# Patient Record
Sex: MALE | Race: WHITE
[De-identification: names, ages, dates, MRNs, and addresses within clinical notes are randomized per-mention and may not be internally consistent; named-entity substitution may affect disease eponyms.]

---

## 2019-06-13 ENCOUNTER — HOSPITAL ENCOUNTER (INPATIENT)
Dept: HOSPITAL 12 - ER | Age: 64
LOS: 4 days | Discharge: NURSING, REHAB OR LONG-TERM CARE | DRG: 689 | End: 2019-06-17
Attending: INTERNAL MEDICINE | Admitting: INTERNAL MEDICINE
Payer: MEDICAID

## 2019-06-13 VITALS — DIASTOLIC BLOOD PRESSURE: 66 MMHG | SYSTOLIC BLOOD PRESSURE: 116 MMHG

## 2019-06-13 VITALS — WEIGHT: 123 LBS | BODY MASS INDEX: 18.64 KG/M2 | HEIGHT: 68 IN

## 2019-06-13 VITALS — SYSTOLIC BLOOD PRESSURE: 137 MMHG | DIASTOLIC BLOOD PRESSURE: 78 MMHG

## 2019-06-13 VITALS — DIASTOLIC BLOOD PRESSURE: 67 MMHG | SYSTOLIC BLOOD PRESSURE: 123 MMHG

## 2019-06-13 VITALS — DIASTOLIC BLOOD PRESSURE: 59 MMHG | SYSTOLIC BLOOD PRESSURE: 116 MMHG

## 2019-06-13 DIAGNOSIS — Z91.15: ICD-10-CM

## 2019-06-13 DIAGNOSIS — Z88.2: ICD-10-CM

## 2019-06-13 DIAGNOSIS — Z74.01: ICD-10-CM

## 2019-06-13 DIAGNOSIS — F03.90: ICD-10-CM

## 2019-06-13 DIAGNOSIS — D53.9: ICD-10-CM

## 2019-06-13 DIAGNOSIS — N18.6: ICD-10-CM

## 2019-06-13 DIAGNOSIS — E78.5: ICD-10-CM

## 2019-06-13 DIAGNOSIS — G93.41: ICD-10-CM

## 2019-06-13 DIAGNOSIS — Z79.4: ICD-10-CM

## 2019-06-13 DIAGNOSIS — I69.351: ICD-10-CM

## 2019-06-13 DIAGNOSIS — E43: ICD-10-CM

## 2019-06-13 DIAGNOSIS — I25.10: ICD-10-CM

## 2019-06-13 DIAGNOSIS — R29.723: ICD-10-CM

## 2019-06-13 DIAGNOSIS — Z79.51: ICD-10-CM

## 2019-06-13 DIAGNOSIS — Z99.2: ICD-10-CM

## 2019-06-13 DIAGNOSIS — E11.51: ICD-10-CM

## 2019-06-13 DIAGNOSIS — I12.0: ICD-10-CM

## 2019-06-13 DIAGNOSIS — R13.10: ICD-10-CM

## 2019-06-13 DIAGNOSIS — R62.7: ICD-10-CM

## 2019-06-13 DIAGNOSIS — Z22.322: ICD-10-CM

## 2019-06-13 DIAGNOSIS — J45.909: ICD-10-CM

## 2019-06-13 DIAGNOSIS — E11.22: ICD-10-CM

## 2019-06-13 DIAGNOSIS — M19.90: ICD-10-CM

## 2019-06-13 DIAGNOSIS — Z89.512: ICD-10-CM

## 2019-06-13 DIAGNOSIS — E11.42: ICD-10-CM

## 2019-06-13 DIAGNOSIS — N39.0: Primary | ICD-10-CM

## 2019-06-13 LAB
ALP SERPL-CCNC: 81 U/L (ref 50–136)
ALT SERPL W/O P-5'-P-CCNC: 31 U/L (ref 16–63)
AST SERPL-CCNC: 16 U/L (ref 15–37)
BASOPHILS # BLD AUTO: 0.1 K/UL (ref 0–8)
BASOPHILS NFR BLD AUTO: 1.3 % (ref 0–2)
BILIRUB DIRECT SERPL-MCNC: 0.1 MG/DL (ref 0–0.2)
BILIRUB SERPL-MCNC: 0.2 MG/DL (ref 0.2–1)
BUN SERPL-MCNC: 52 MG/DL (ref 7–18)
CHLORIDE SERPL-SCNC: 107 MMOL/L (ref 98–107)
CO2 SERPL-SCNC: 33 MMOL/L (ref 21–32)
CREAT SERPL-MCNC: 3.8 MG/DL (ref 0.6–1.3)
EOSINOPHIL # BLD AUTO: 0.2 K/UL (ref 0–0.7)
EOSINOPHIL NFR BLD AUTO: 2.7 % (ref 0–7)
GLUCOSE SERPL-MCNC: 111 MG/DL (ref 74–106)
HCT VFR BLD AUTO: 33 % (ref 36.7–47.1)
HGB BLD-MCNC: 10.9 G/DL (ref 12.5–16.3)
LIPASE SERPL-CCNC: 80 U/L (ref 73–393)
LYMPHOCYTES # BLD AUTO: 1.6 K/UL (ref 20–40)
LYMPHOCYTES NFR BLD AUTO: 23.8 % (ref 20.5–51.5)
MCH RBC QN AUTO: 32 UUG (ref 23.8–33.4)
MCHC RBC AUTO-ENTMCNC: 33 G/DL (ref 32.5–36.3)
MCV RBC AUTO: 97.2 FL (ref 73–96.2)
MONOCYTES # BLD AUTO: 0.3 K/UL (ref 2–10)
MONOCYTES NFR BLD AUTO: 4.3 % (ref 0–11)
NEUTROPHILS # BLD AUTO: 4.5 K/UL (ref 1.8–8.9)
NEUTROPHILS NFR BLD AUTO: 67.9 % (ref 38.5–71.5)
PLATELET # BLD AUTO: 246 K/UL (ref 152–348)
POTASSIUM SERPL-SCNC: 4.5 MMOL/L (ref 3.5–5.1)
RBC # BLD AUTO: 3.4 MIL/UL (ref 4.06–5.63)
WBC # BLD AUTO: 6.7 K/UL (ref 3.6–10.2)
WS STN SPEC: 6.3 G/DL (ref 6.4–8.2)

## 2019-06-13 PROCEDURE — G0378 HOSPITAL OBSERVATION PER HR: HCPCS

## 2019-06-13 PROCEDURE — A4663 DIALYSIS BLOOD PRESSURE CUFF: HCPCS

## 2019-06-13 PROCEDURE — C1758 CATHETER, URETERAL: HCPCS

## 2019-06-13 PROCEDURE — 5A1D70Z PERFORMANCE OF URINARY FILTRATION, INTERMITTENT, LESS THAN 6 HOURS PER DAY: ICD-10-PCS | Performed by: INTERNAL MEDICINE

## 2019-06-13 RX ADMIN — LACTOBACILLUS ACIDOPH-L.BULGARICUS 1 MILLION CELL CHEWABLE TABLET SCH TAB.CHEW: at 20:48

## 2019-06-13 RX ADMIN — DIVALPROEX SODIUM SCH MG: 125 CAPSULE ORAL at 17:30

## 2019-06-13 RX ADMIN — DOCUSATE SODIUM SCH MG: 100 CAPSULE, LIQUID FILLED ORAL at 17:30

## 2019-06-13 RX ADMIN — ANORECTAL OINTMENT SCH GM: 15.7; .44; 24; 20.6 OINTMENT TOPICAL at 20:51

## 2019-06-13 RX ADMIN — CLONAZEPAM SCH MG: 0.5 TABLET ORAL at 17:32

## 2019-06-13 RX ADMIN — BENZTROPINE MESYLATE SCH MG: 1 TABLET ORAL at 17:30

## 2019-06-13 RX ADMIN — HYDROCODONE BITARTRATE AND ACETAMINOPHEN PRN TAB: 5; 325 TABLET ORAL at 20:48

## 2019-06-13 RX ADMIN — CALCIUM ACETATE SCH MG: 667 CAPSULE ORAL at 17:30

## 2019-06-13 NOTE — NUR
RECEIVED PT COMFORTABLY ON BED. PT SHOWS NO SIGNS OF ACUTE DISTRESS. PT IV INTACT. SAFETY 
AND COMFORT PROVIDED. WILL CONTINUE TO MONITOR.

## 2019-06-13 NOTE — NUR
RECEIVED PATIENT FROM ER VIA [facility], WITH DIAGNOSIS  OF  RENAL FAILURE,  PATIENT ALERT  AND 
ORIENTED TO NAME, PATIENT  IS CONFUSED.   PATIENT  HAS RIGHT SIDE  HEMIPARESIS, RIGHT 
CONTRACTURES, A DIALYSIS PATIENT WITH  CENTRAL LINE ON THE LEFT  CHEST. IV ON THE LEFT 
FOREARM.   MD AWARE OF PATIENT . BED IN LOW  POSITION , SIDE RAILS UP  WITH BED ALARM.  WILL 
CONTINUE TREATMENT AND WILL CONTINUE TO MONITOR.

## 2019-06-13 NOTE — NUR
PATIENT IN BED  AWAKE AND ALERT, NO SOB  NOTED AT THIS TIME,  NO C/O PAIN  AT THIS TIME, 
PROVIDE  SAFETY AND COMFORT  AT ALL TIMES.   ECHOCARDIOGRAM DONE,  WILL CONTINUE TREATMENT 
PLAN.

## 2019-06-13 NOTE — NUR
Patient is resting comfortably on gurney while actively moving both legs & left 
arm, respiration:easy, for transfer to 3rd floor telemetry floor 316 as soon as 
possible.

## 2019-06-14 VITALS — SYSTOLIC BLOOD PRESSURE: 101 MMHG | DIASTOLIC BLOOD PRESSURE: 49 MMHG

## 2019-06-14 VITALS — SYSTOLIC BLOOD PRESSURE: 111 MMHG | DIASTOLIC BLOOD PRESSURE: 65 MMHG

## 2019-06-14 VITALS — DIASTOLIC BLOOD PRESSURE: 70 MMHG | SYSTOLIC BLOOD PRESSURE: 125 MMHG

## 2019-06-14 VITALS — SYSTOLIC BLOOD PRESSURE: 104 MMHG | DIASTOLIC BLOOD PRESSURE: 71 MMHG

## 2019-06-14 LAB
ALP SERPL-CCNC: 77 U/L (ref 50–136)
ALT SERPL W/O P-5'-P-CCNC: 28 U/L (ref 16–63)
APPEARANCE UR: (no result)
AST SERPL-CCNC: 16 U/L (ref 15–37)
BASOPHILS # BLD AUTO: 0 K/UL (ref 0–8)
BASOPHILS NFR BLD AUTO: 0.8 % (ref 0–2)
BILIRUB SERPL-MCNC: 0.1 MG/DL (ref 0.2–1)
BILIRUB UR QL STRIP: NEGATIVE
BUN SERPL-MCNC: 34 MG/DL (ref 7–18)
CHLORIDE SERPL-SCNC: 112 MMOL/L (ref 98–107)
CHOLEST SERPL-MCNC: 85 MG/DL (ref ?–200)
CO2 SERPL-SCNC: 31 MMOL/L (ref 21–32)
COLOR UR: YELLOW
CREAT SERPL-MCNC: 2.5 MG/DL (ref 0.6–1.3)
DEPRECATED SQUAMOUS URNS QL MICRO: (no result) /HPF
EOSINOPHIL # BLD AUTO: 0.2 K/UL (ref 0–0.7)
EOSINOPHIL NFR BLD AUTO: 3.9 % (ref 0–7)
GLUCOSE SERPL-MCNC: 85 MG/DL (ref 74–106)
GLUCOSE UR STRIP-MCNC: NEGATIVE MG/DL
HCT VFR BLD AUTO: 31.3 % (ref 36.7–47.1)
HDLC SERPL-MCNC: 31 MG/DL (ref 40–60)
HGB BLD-MCNC: 10.4 G/DL (ref 12.5–16.3)
HGB UR QL STRIP: (no result)
IRON SERPL-MCNC: 92 UG/DL (ref 50–175)
KETONES UR STRIP-MCNC: NEGATIVE MG/DL
LEUKOCYTE ESTERASE UR QL STRIP: (no result)
LYMPHOCYTES # BLD AUTO: 1.6 K/UL (ref 20–40)
LYMPHOCYTES NFR BLD AUTO: 27.6 % (ref 20.5–51.5)
MAGNESIUM SERPL-MCNC: 2 MG/DL (ref 1.8–2.4)
MCH RBC QN AUTO: 32.2 UUG (ref 23.8–33.4)
MCHC RBC AUTO-ENTMCNC: 33 G/DL (ref 32.5–36.3)
MCV RBC AUTO: 97.4 FL (ref 73–96.2)
MONOCYTES # BLD AUTO: 0.3 K/UL (ref 2–10)
MONOCYTES NFR BLD AUTO: 5.1 % (ref 0–11)
NEUTROPHILS # BLD AUTO: 3.7 K/UL (ref 1.8–8.9)
NEUTROPHILS NFR BLD AUTO: 62.6 % (ref 38.5–71.5)
NITRITE UR QL STRIP: NEGATIVE
PH UR STRIP: 6 [PH] (ref 5–8)
PHOSPHATE SERPL-MCNC: 3.8 MG/DL (ref 2.5–4.9)
PLATELET # BLD AUTO: 198 K/UL (ref 152–348)
POTASSIUM SERPL-SCNC: 4 MMOL/L (ref 3.5–5.1)
RBC # BLD AUTO: 3.22 MIL/UL (ref 4.06–5.63)
SP GR UR STRIP: 1.02 (ref 1–1.03)
TRIGL SERPL-MCNC: 48 MG/DL (ref 30–150)
TSH SERPL DL<=0.005 MIU/L-ACNC: 2.46 MIU/ML (ref 0.36–3.74)
UROBILINOGEN UR STRIP-MCNC: 0.2 E.U./DL
WBC # BLD AUTO: 5.9 K/UL (ref 3.6–10.2)
WBC #/AREA URNS HPF: (no result) /HPF
WS STN SPEC: 5.7 G/DL (ref 6.4–8.2)

## 2019-06-14 RX ADMIN — DOCUSATE SODIUM SCH MG: 100 CAPSULE, LIQUID FILLED ORAL at 10:08

## 2019-06-14 RX ADMIN — LACTOBACILLUS ACIDOPH-L.BULGARICUS 1 MILLION CELL CHEWABLE TABLET SCH TAB.CHEW: at 10:08

## 2019-06-14 RX ADMIN — CALCIUM ACETATE SCH MG: 667 CAPSULE ORAL at 17:26

## 2019-06-14 RX ADMIN — DIVALPROEX SODIUM SCH MG: 125 CAPSULE ORAL at 17:26

## 2019-06-14 RX ADMIN — FOLIC ACID SCH MG: 1 TABLET ORAL at 10:09

## 2019-06-14 RX ADMIN — DIVALPROEX SODIUM SCH MG: 125 CAPSULE ORAL at 12:48

## 2019-06-14 RX ADMIN — DOCUSATE SODIUM SCH MG: 100 CAPSULE, LIQUID FILLED ORAL at 17:26

## 2019-06-14 RX ADMIN — CLONAZEPAM SCH MG: 0.5 TABLET ORAL at 00:45

## 2019-06-14 RX ADMIN — ANORECTAL OINTMENT SCH APPLIC: 15.7; .44; 24; 20.6 OINTMENT TOPICAL at 11:02

## 2019-06-14 RX ADMIN — BENZTROPINE MESYLATE SCH MG: 1 TABLET ORAL at 17:29

## 2019-06-14 RX ADMIN — CALCIUM ACETATE SCH MG: 667 CAPSULE ORAL at 10:08

## 2019-06-14 RX ADMIN — Medication SCH TAB: at 12:48

## 2019-06-14 RX ADMIN — CLONAZEPAM SCH MG: 0.5 TABLET ORAL at 10:18

## 2019-06-14 RX ADMIN — VITAMIN D, TAB 1000IU (100/BT) SCH UNIT: 25 TAB at 10:07

## 2019-06-14 RX ADMIN — ANORECTAL OINTMENT SCH APPLIC: 15.7; .44; 24; 20.6 OINTMENT TOPICAL at 21:03

## 2019-06-14 RX ADMIN — BENZTROPINE MESYLATE SCH MG: 1 TABLET ORAL at 12:48

## 2019-06-14 RX ADMIN — LACTOBACILLUS ACIDOPH-L.BULGARICUS 1 MILLION CELL CHEWABLE TABLET SCH TAB.CHEW: at 20:59

## 2019-06-14 RX ADMIN — DEXTROSE SCH MLS/HR: 50 INJECTION, SOLUTION INTRAVENOUS at 12:44

## 2019-06-14 RX ADMIN — BENZTROPINE MESYLATE SCH MG: 1 TABLET ORAL at 10:09

## 2019-06-14 RX ADMIN — DIVALPROEX SODIUM SCH MG: 125 CAPSULE ORAL at 10:08

## 2019-06-14 RX ADMIN — CLONAZEPAM SCH MG: 0.5 TABLET ORAL at 17:26

## 2019-06-14 RX ADMIN — PANTOPRAZOLE SODIUM SCH MG: 40 TABLET, DELAYED RELEASE ORAL at 06:21

## 2019-06-14 NOTE — NUR
SEEN AND EXAMINED BY WOUND CARE RN. PT NEEDS AN ADDITIONAL PHOTO ON HIS RIGHT SIDE OF HIS 
LEG AND FOOT AND ALSO ON HIS LEFT FA. ENDORSED IT TO THE NEXT SHIFT NURSE TO FOL;LOW IT UP.

## 2019-06-14 NOTE — NUR
AT 0050 NOTIFY  ON CALL REGARDING PT TEMPERATURE OF 95.3"F. HILARY PICKENS ORDERED 
PANCULTURE ( BLOOD CULTURE, URINE CULTURE, RESPIRATORY CULTURE AND URINALISYS)CHARGE NURSE 
AWARE . PUT BEAR HUGGER FOR THE PT. RECENT TEMPERATURE WAS 97.9 'F.  PT SLEPT 
INTERMITTENTLY. PT SHOWS NO SIGNS OF ACUTE DISTRESS. PRESCRIBED MEDICATION GIVEN AND PT 
TOLERATED IT WELL. SAFETY AND COMFORT PROVIDED. WILL ENDORSE ACCORDINGLY TO INCOMING NURSE 
FOR CONTINUITY OF CARE.

## 2019-06-14 NOTE — NUR
REPOSITION TO SIDES AND ALL SIDERAILS ARE PADDED FOR SAFETY. PT IS EATING AND SWALLOWING 
WELL WITH MAX ASSIST.

## 2019-06-14 NOTE — NUR
Received pt asleep lying on bed, no s/sx of acute distress. On room air, breathing even and 
unlabored. Left BKA noted, Rt. upper arm flaccid. Will continue to monitor.

## 2019-06-14 NOTE — NUR
WOUND CARE CONSULT: PT PRESENTS WITH SACRAL SCARRING, RT ARM SKIN TEAR AND CALLUSES TO RT 
LATERAL FOOT WITH DRY ABRASIONS TO RT LOWER LEG, PRESENT ON ADMISSION. PT COMBATIVE AT 
TIMES. PT IS INCONTINENT. LEFT BELOW KNEE AMPUTATION STUMP NOTED. PT TENDS TO KICK HIS LEGS 
AT TIMES. RECOMMENDATIONS MADE FOR WOUND CARE AND SKIN PROTECTION. DISCUSSED WITH NURSING 
STAFF. WILL SEE PRN. MD IN AGREEMENT WITH PLAN OF CARE. 

-------------------------------------------------------------------------------

Addendum: 06/14/19 at 1510 by AMARIS OLIVARES RN

-------------------------------------------------------------------------------

Amended: Links added.

## 2019-06-14 NOTE — NUR
RECIEVED PT LYING IN BED, THRUSHING AND SOMEWHAT RESTLESS. APHASIC, FACE IS EVEN AND 
SYMMETRICAL, FOLLOWS SIMPOLE COMMANDS. PT IS RIGHT SIDED WEAKNESS AND UPPER ARM IS 
CONTRACTED. HR -SB. IV HL ON THE LEFT FA. PT HAS A BKA. STUMP IS INTACT.

## 2019-06-15 VITALS — SYSTOLIC BLOOD PRESSURE: 125 MMHG | DIASTOLIC BLOOD PRESSURE: 69 MMHG

## 2019-06-15 VITALS — SYSTOLIC BLOOD PRESSURE: 101 MMHG | DIASTOLIC BLOOD PRESSURE: 68 MMHG

## 2019-06-15 VITALS — SYSTOLIC BLOOD PRESSURE: 114 MMHG | DIASTOLIC BLOOD PRESSURE: 67 MMHG

## 2019-06-15 VITALS — DIASTOLIC BLOOD PRESSURE: 78 MMHG | SYSTOLIC BLOOD PRESSURE: 120 MMHG

## 2019-06-15 VITALS — SYSTOLIC BLOOD PRESSURE: 128 MMHG | DIASTOLIC BLOOD PRESSURE: 69 MMHG

## 2019-06-15 RX ADMIN — MUPIROCIN SCH APPLIC: 20 OINTMENT TOPICAL at 21:00

## 2019-06-15 RX ADMIN — ANORECTAL OINTMENT SCH APPLIC: 15.7; .44; 24; 20.6 OINTMENT TOPICAL at 22:22

## 2019-06-15 RX ADMIN — MUPIROCIN SCH APPLIC: 20 OINTMENT TOPICAL at 19:00

## 2019-06-15 RX ADMIN — FOLIC ACID SCH MG: 1 TABLET ORAL at 09:29

## 2019-06-15 RX ADMIN — CLONAZEPAM SCH MG: 0.5 TABLET ORAL at 00:09

## 2019-06-15 RX ADMIN — Medication SCH TAB: at 09:29

## 2019-06-15 RX ADMIN — ANORECTAL OINTMENT SCH APPLIC: 15.7; .44; 24; 20.6 OINTMENT TOPICAL at 13:59

## 2019-06-15 RX ADMIN — BENZTROPINE MESYLATE SCH MG: 1 TABLET ORAL at 16:37

## 2019-06-15 RX ADMIN — DIVALPROEX SODIUM SCH MG: 125 CAPSULE ORAL at 16:37

## 2019-06-15 RX ADMIN — BENZTROPINE MESYLATE SCH MG: 1 TABLET ORAL at 09:28

## 2019-06-15 RX ADMIN — DOCUSATE SODIUM SCH MG: 100 CAPSULE, LIQUID FILLED ORAL at 09:28

## 2019-06-15 RX ADMIN — LACTOBACILLUS ACIDOPH-L.BULGARICUS 1 MILLION CELL CHEWABLE TABLET SCH TAB.CHEW: at 09:29

## 2019-06-15 RX ADMIN — VITAMIN D, TAB 1000IU (100/BT) SCH UNIT: 25 TAB at 09:27

## 2019-06-15 RX ADMIN — DIVALPROEX SODIUM SCH MG: 125 CAPSULE ORAL at 13:58

## 2019-06-15 RX ADMIN — CALCIUM ACETATE SCH MG: 667 CAPSULE ORAL at 09:29

## 2019-06-15 RX ADMIN — LACTOBACILLUS ACIDOPH-L.BULGARICUS 1 MILLION CELL CHEWABLE TABLET SCH TAB.CHEW: at 22:05

## 2019-06-15 RX ADMIN — DEXTROSE SCH MLS/HR: 50 INJECTION, SOLUTION INTRAVENOUS at 12:03

## 2019-06-15 RX ADMIN — CALCIUM ACETATE SCH MG: 667 CAPSULE ORAL at 16:36

## 2019-06-15 RX ADMIN — HYDROCODONE BITARTRATE AND ACETAMINOPHEN PRN TAB: 5; 325 TABLET ORAL at 22:04

## 2019-06-15 RX ADMIN — BENZTROPINE MESYLATE SCH MG: 1 TABLET ORAL at 13:58

## 2019-06-15 RX ADMIN — CLONAZEPAM SCH MG: 0.5 TABLET ORAL at 09:28

## 2019-06-15 RX ADMIN — DIVALPROEX SODIUM SCH MG: 125 CAPSULE ORAL at 09:30

## 2019-06-15 RX ADMIN — MUPIROCIN SCH APPLIC: 20 OINTMENT TOPICAL at 22:05

## 2019-06-15 RX ADMIN — DOCUSATE SODIUM SCH MG: 100 CAPSULE, LIQUID FILLED ORAL at 16:37

## 2019-06-15 RX ADMIN — CLONAZEPAM SCH MG: 0.5 TABLET ORAL at 16:37

## 2019-06-15 RX ADMIN — PANTOPRAZOLE SODIUM SCH MG: 40 TABLET, DELAYED RELEASE ORAL at 06:01

## 2019-06-15 NOTE — NUR
patient received lying in bed. a/ox1 and confused. safety and comfort measures provided. bed 
in lowest position and side rails padded. will continue to care for patient.

## 2019-06-15 NOTE — NUR
Pt now resting comfortably in bed, no SOB and no s/sx of distress. DVT pumps in room, 
offered to apply in right leg, pt refused overnight. Dressing to right elbow skin tear 
changed, covered with Kerlix clean dry and intact. Repositioned for comfort, kept clean and 
dry. Consent to HD in chart, scheduled for today. Report given to incoming nurse for 
continuity of care.

## 2019-06-15 NOTE — NUR
RECIEVED PT VERY SOUND ASLEEP IN BED, WITH NO APPARENT DISTRESS NOTED. COLOR IS GOOD. ALL 
SIDERAILS ARE PADDED FOR SAFETY. TELE IS SB, SR, NO ECTOPY.VSS.

## 2019-06-15 NOTE — NUR
patient's meds given late due to dialysis nurse arriving at 2000 and dialyzing patient. 
dialysis nurse, Kelsy Miguel confirmed with me that the medications prescribed for tonight 
were okay to give to patient. patient refused Bactroban to the nares for MRSA. I educated 
the patient that it was medically necessary for him to get the ointment in his nares to 
treat him for the MRSA. He stated, "no" and pushed my hands away a few times.

## 2019-06-16 VITALS — SYSTOLIC BLOOD PRESSURE: 135 MMHG | DIASTOLIC BLOOD PRESSURE: 78 MMHG

## 2019-06-16 VITALS — DIASTOLIC BLOOD PRESSURE: 72 MMHG | SYSTOLIC BLOOD PRESSURE: 129 MMHG

## 2019-06-16 VITALS — DIASTOLIC BLOOD PRESSURE: 83 MMHG | SYSTOLIC BLOOD PRESSURE: 137 MMHG

## 2019-06-16 VITALS — DIASTOLIC BLOOD PRESSURE: 89 MMHG | SYSTOLIC BLOOD PRESSURE: 137 MMHG

## 2019-06-16 LAB
ALP SERPL-CCNC: 80 U/L (ref 50–136)
ALT SERPL W/O P-5'-P-CCNC: 26 U/L (ref 16–63)
AST SERPL-CCNC: 15 U/L (ref 15–37)
BASOPHILS # BLD AUTO: 0.1 K/UL (ref 0–8)
BASOPHILS NFR BLD AUTO: 0.9 % (ref 0–2)
BILIRUB SERPL-MCNC: 0.2 MG/DL (ref 0.2–1)
BUN SERPL-MCNC: 29 MG/DL (ref 7–18)
CHLORIDE SERPL-SCNC: 111 MMOL/L (ref 98–107)
CO2 SERPL-SCNC: 32 MMOL/L (ref 21–32)
CREAT SERPL-MCNC: 2.1 MG/DL (ref 0.6–1.3)
EOSINOPHIL # BLD AUTO: 0.2 K/UL (ref 0–0.7)
EOSINOPHIL NFR BLD AUTO: 4.2 % (ref 0–7)
GLUCOSE SERPL-MCNC: 70 MG/DL (ref 74–106)
HCT VFR BLD AUTO: 31.5 % (ref 36.7–47.1)
HGB BLD-MCNC: 10.4 G/DL (ref 12.5–16.3)
LYMPHOCYTES # BLD AUTO: 1.5 K/UL (ref 20–40)
LYMPHOCYTES NFR BLD AUTO: 25.7 % (ref 20.5–51.5)
MAGNESIUM SERPL-MCNC: 2 MG/DL (ref 1.8–2.4)
MCH RBC QN AUTO: 32.2 UUG (ref 23.8–33.4)
MCHC RBC AUTO-ENTMCNC: 33 G/DL (ref 32.5–36.3)
MCV RBC AUTO: 97.7 FL (ref 73–96.2)
MONOCYTES # BLD AUTO: 0.3 K/UL (ref 2–10)
MONOCYTES NFR BLD AUTO: 4.8 % (ref 0–11)
NEUTROPHILS # BLD AUTO: 3.8 K/UL (ref 1.8–8.9)
NEUTROPHILS NFR BLD AUTO: 64.4 % (ref 38.5–71.5)
PHOSPHATE SERPL-MCNC: 2.8 MG/DL (ref 2.5–4.9)
PLATELET # BLD AUTO: 196 K/UL (ref 152–348)
POTASSIUM SERPL-SCNC: 4.4 MMOL/L (ref 3.5–5.1)
RBC # BLD AUTO: 3.22 MIL/UL (ref 4.06–5.63)
WBC # BLD AUTO: 5.9 K/UL (ref 3.6–10.2)
WS STN SPEC: 6.3 G/DL (ref 6.4–8.2)

## 2019-06-16 RX ADMIN — DOCUSATE SODIUM SCH MG: 100 CAPSULE, LIQUID FILLED ORAL at 17:17

## 2019-06-16 RX ADMIN — MUPIROCIN SCH APPLIC: 20 OINTMENT TOPICAL at 20:28

## 2019-06-16 RX ADMIN — ANORECTAL OINTMENT SCH APPLIC: 15.7; .44; 24; 20.6 OINTMENT TOPICAL at 20:28

## 2019-06-16 RX ADMIN — DIVALPROEX SODIUM SCH MG: 125 CAPSULE ORAL at 17:17

## 2019-06-16 RX ADMIN — BENZTROPINE MESYLATE SCH MG: 1 TABLET ORAL at 12:25

## 2019-06-16 RX ADMIN — Medication SCH TAB: at 09:19

## 2019-06-16 RX ADMIN — DEXTROSE SCH MLS/HR: 50 INJECTION, SOLUTION INTRAVENOUS at 11:06

## 2019-06-16 RX ADMIN — LACTOBACILLUS ACIDOPH-L.BULGARICUS 1 MILLION CELL CHEWABLE TABLET SCH TAB.CHEW: at 08:18

## 2019-06-16 RX ADMIN — MUPIROCIN SCH APPLIC: 20 OINTMENT TOPICAL at 08:19

## 2019-06-16 RX ADMIN — ANORECTAL OINTMENT SCH APPLIC: 15.7; .44; 24; 20.6 OINTMENT TOPICAL at 09:00

## 2019-06-16 RX ADMIN — CLONAZEPAM SCH MG: 0.5 TABLET ORAL at 00:06

## 2019-06-16 RX ADMIN — HYDROCODONE BITARTRATE AND ACETAMINOPHEN PRN TAB: 5; 325 TABLET ORAL at 23:55

## 2019-06-16 RX ADMIN — LACTOBACILLUS ACIDOPH-L.BULGARICUS 1 MILLION CELL CHEWABLE TABLET SCH TAB.CHEW: at 20:27

## 2019-06-16 RX ADMIN — VITAMIN D, TAB 1000IU (100/BT) SCH UNIT: 25 TAB at 08:19

## 2019-06-16 RX ADMIN — MUPIROCIN SCH APPLIC: 20 OINTMENT TOPICAL at 20:35

## 2019-06-16 RX ADMIN — CLONAZEPAM SCH MG: 0.5 TABLET ORAL at 08:18

## 2019-06-16 RX ADMIN — BENZTROPINE MESYLATE SCH MG: 1 TABLET ORAL at 17:17

## 2019-06-16 RX ADMIN — CALCIUM ACETATE SCH MG: 667 CAPSULE ORAL at 08:18

## 2019-06-16 RX ADMIN — DIVALPROEX SODIUM SCH MG: 125 CAPSULE ORAL at 08:19

## 2019-06-16 RX ADMIN — PANTOPRAZOLE SODIUM SCH MG: 40 TABLET, DELAYED RELEASE ORAL at 06:10

## 2019-06-16 RX ADMIN — CLONAZEPAM SCH MG: 0.5 TABLET ORAL at 23:55

## 2019-06-16 RX ADMIN — ACETAMINOPHEN PRN MG: 325 TABLET ORAL at 04:19

## 2019-06-16 RX ADMIN — FOLIC ACID SCH MG: 1 TABLET ORAL at 08:19

## 2019-06-16 RX ADMIN — DOCUSATE SODIUM SCH MG: 100 CAPSULE, LIQUID FILLED ORAL at 08:18

## 2019-06-16 RX ADMIN — CALCIUM ACETATE SCH MG: 667 CAPSULE ORAL at 17:17

## 2019-06-16 RX ADMIN — CLONAZEPAM SCH MG: 0.5 TABLET ORAL at 17:17

## 2019-06-16 RX ADMIN — HYDROCODONE BITARTRATE AND ACETAMINOPHEN PRN TAB: 5; 325 TABLET ORAL at 06:10

## 2019-06-16 RX ADMIN — BENZTROPINE MESYLATE SCH MG: 1 TABLET ORAL at 08:19

## 2019-06-16 RX ADMIN — DIVALPROEX SODIUM SCH MG: 125 CAPSULE ORAL at 12:30

## 2019-06-16 RX ADMIN — ACETAMINOPHEN PRN MG: 325 TABLET ORAL at 20:27

## 2019-06-16 NOTE — NUR
patient received lying in bed and confused. safety and comfort measures provided. HOB 
elevated. will continue care.

## 2019-06-16 NOTE — NUR
patient sleeping intermittently. a/o x 1 and confused. safety and comfort measures provided. 
all medications administered and tolerated well. will endorse care to morning nurse.

## 2019-06-16 NOTE — NUR
patient  in bed with HOB elevated , alert and oriented, no SOB noted at this time, no c/o 
pain noted at this time, bed in low position , with 2 side rails  up .  will continue to 
monitor and safety provided at all times.

## 2019-06-16 NOTE — NUR
provided wound care for patient. applied mepilex and offloaded on sacral scar. right arm 
wound cleansed with NS, applied xerofoam, mepilex and kerlix.

## 2019-06-16 NOTE — NUR
RECEIVED PATIENT IN BED ALERT AND ORIENTED X1 WITH HOB ELEVATED WITH BED IN LOW POSITION , 2 
SIDE RAILS  UP WITH BED ALARM ON. NO SOB NOTED AND NO C/O PAIN ,  WILL CONTINUE TO MONITOR 
AND PROVIDE SAFETY  AT ALL TIMES,  CALL LIGHT WITHIN REACHED. CONTINUE  TREATMENT PLAN.

## 2019-06-16 NOTE — NUR
patient administered norco using FLACC scale to provide comfort measure and pain relief. 
tolerated well.

## 2019-06-17 VITALS — SYSTOLIC BLOOD PRESSURE: 157 MMHG | DIASTOLIC BLOOD PRESSURE: 83 MMHG

## 2019-06-17 VITALS — DIASTOLIC BLOOD PRESSURE: 68 MMHG | SYSTOLIC BLOOD PRESSURE: 140 MMHG

## 2019-06-17 VITALS — DIASTOLIC BLOOD PRESSURE: 88 MMHG | SYSTOLIC BLOOD PRESSURE: 135 MMHG

## 2019-06-17 LAB
ALP SERPL-CCNC: 74 U/L (ref 50–136)
ALT SERPL W/O P-5'-P-CCNC: 21 U/L (ref 16–63)
AST SERPL-CCNC: 14 U/L (ref 15–37)
BASOPHILS # BLD AUTO: 0.1 K/UL (ref 0–8)
BASOPHILS NFR BLD AUTO: 0.9 % (ref 0–2)
BILIRUB SERPL-MCNC: 0.2 MG/DL (ref 0.2–1)
BUN SERPL-MCNC: 42 MG/DL (ref 7–18)
CHLORIDE SERPL-SCNC: 111 MMOL/L (ref 98–107)
CO2 SERPL-SCNC: 32 MMOL/L (ref 21–32)
CREAT SERPL-MCNC: 2.3 MG/DL (ref 0.6–1.3)
EOSINOPHIL # BLD AUTO: 0.3 K/UL (ref 0–0.7)
EOSINOPHIL NFR BLD AUTO: 4.6 % (ref 0–7)
GLUCOSE SERPL-MCNC: 66 MG/DL (ref 74–106)
HCT VFR BLD AUTO: 31.6 % (ref 36.7–47.1)
HGB BLD-MCNC: 10.4 G/DL (ref 12.5–16.3)
LYMPHOCYTES # BLD AUTO: 1.9 K/UL (ref 20–40)
LYMPHOCYTES NFR BLD AUTO: 32.8 % (ref 20.5–51.5)
MAGNESIUM SERPL-MCNC: 2.1 MG/DL (ref 1.8–2.4)
MCH RBC QN AUTO: 32.2 UUG (ref 23.8–33.4)
MCHC RBC AUTO-ENTMCNC: 33 G/DL (ref 32.5–36.3)
MCV RBC AUTO: 97.4 FL (ref 73–96.2)
MONOCYTES # BLD AUTO: 0.2 K/UL (ref 2–10)
MONOCYTES NFR BLD AUTO: 4 % (ref 0–11)
NEUTROPHILS # BLD AUTO: 3.3 K/UL (ref 1.8–8.9)
NEUTROPHILS NFR BLD AUTO: 57.7 % (ref 38.5–71.5)
PHOSPHATE SERPL-MCNC: 3.8 MG/DL (ref 2.5–4.9)
PLATELET # BLD AUTO: 199 K/UL (ref 152–348)
POTASSIUM SERPL-SCNC: 5 MMOL/L (ref 3.5–5.1)
RBC # BLD AUTO: 3.24 MIL/UL (ref 4.06–5.63)
WBC # BLD AUTO: 5.7 K/UL (ref 3.6–10.2)
WS STN SPEC: 6.3 G/DL (ref 6.4–8.2)

## 2019-06-17 RX ADMIN — DOCUSATE SODIUM SCH MG: 100 CAPSULE, LIQUID FILLED ORAL at 17:29

## 2019-06-17 RX ADMIN — Medication SCH TAB: at 08:30

## 2019-06-17 RX ADMIN — DIVALPROEX SODIUM SCH MG: 125 CAPSULE ORAL at 17:29

## 2019-06-17 RX ADMIN — DIVALPROEX SODIUM SCH MG: 125 CAPSULE ORAL at 08:30

## 2019-06-17 RX ADMIN — BENZTROPINE MESYLATE SCH MG: 1 TABLET ORAL at 08:30

## 2019-06-17 RX ADMIN — CALCIUM ACETATE SCH MG: 667 CAPSULE ORAL at 17:29

## 2019-06-17 RX ADMIN — CALCIUM ACETATE SCH MG: 667 CAPSULE ORAL at 08:30

## 2019-06-17 RX ADMIN — MUPIROCIN SCH APPLIC: 20 OINTMENT TOPICAL at 08:30

## 2019-06-17 RX ADMIN — DIVALPROEX SODIUM SCH MG: 125 CAPSULE ORAL at 13:45

## 2019-06-17 RX ADMIN — ANORECTAL OINTMENT SCH APPLIC: 15.7; .44; 24; 20.6 OINTMENT TOPICAL at 09:58

## 2019-06-17 RX ADMIN — BENZTROPINE MESYLATE SCH MG: 1 TABLET ORAL at 17:29

## 2019-06-17 RX ADMIN — DEXTROSE SCH MLS/HR: 50 INJECTION, SOLUTION INTRAVENOUS at 11:39

## 2019-06-17 RX ADMIN — CLONAZEPAM SCH MG: 0.5 TABLET ORAL at 08:29

## 2019-06-17 RX ADMIN — PANTOPRAZOLE SODIUM SCH MG: 40 TABLET, DELAYED RELEASE ORAL at 06:27

## 2019-06-17 RX ADMIN — VITAMIN D, TAB 1000IU (100/BT) SCH UNIT: 25 TAB at 08:29

## 2019-06-17 RX ADMIN — DOCUSATE SODIUM SCH MG: 100 CAPSULE, LIQUID FILLED ORAL at 08:29

## 2019-06-17 RX ADMIN — CLONAZEPAM SCH MG: 0.5 TABLET ORAL at 17:29

## 2019-06-17 RX ADMIN — BENZTROPINE MESYLATE SCH MG: 1 TABLET ORAL at 13:45

## 2019-06-17 RX ADMIN — LACTOBACILLUS ACIDOPH-L.BULGARICUS 1 MILLION CELL CHEWABLE TABLET SCH TAB.CHEW: at 08:30

## 2019-06-17 RX ADMIN — FOLIC ACID SCH MG: 1 TABLET ORAL at 08:30

## 2019-10-10 ENCOUNTER — HOSPITAL ENCOUNTER (INPATIENT)
Dept: HOSPITAL 12 - ER | Age: 64
LOS: 3 days | Discharge: NURSING, REHAB OR LONG-TERM CARE | DRG: 637 | End: 2019-10-13
Payer: MEDICARE

## 2019-10-10 VITALS — SYSTOLIC BLOOD PRESSURE: 144 MMHG | DIASTOLIC BLOOD PRESSURE: 72 MMHG

## 2019-10-10 VITALS — HEIGHT: 68 IN | WEIGHT: 125 LBS | BODY MASS INDEX: 18.94 KG/M2

## 2019-10-10 VITALS — SYSTOLIC BLOOD PRESSURE: 146 MMHG | DIASTOLIC BLOOD PRESSURE: 66 MMHG

## 2019-10-10 DIAGNOSIS — Z86.14: ICD-10-CM

## 2019-10-10 DIAGNOSIS — M62.84: ICD-10-CM

## 2019-10-10 DIAGNOSIS — K21.9: ICD-10-CM

## 2019-10-10 DIAGNOSIS — Z88.0: ICD-10-CM

## 2019-10-10 DIAGNOSIS — I69.351: ICD-10-CM

## 2019-10-10 DIAGNOSIS — E78.5: ICD-10-CM

## 2019-10-10 DIAGNOSIS — G47.00: ICD-10-CM

## 2019-10-10 DIAGNOSIS — E03.9: ICD-10-CM

## 2019-10-10 DIAGNOSIS — F20.9: ICD-10-CM

## 2019-10-10 DIAGNOSIS — G93.41: ICD-10-CM

## 2019-10-10 DIAGNOSIS — L97.519: ICD-10-CM

## 2019-10-10 DIAGNOSIS — Z88.1: ICD-10-CM

## 2019-10-10 DIAGNOSIS — Z89.512: ICD-10-CM

## 2019-10-10 DIAGNOSIS — F41.9: ICD-10-CM
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
## 2019-10-10 DIAGNOSIS — Z88.2: ICD-10-CM

## 2019-10-10 DIAGNOSIS — N18.6: ICD-10-CM

## 2019-10-10 DIAGNOSIS — L03.115: ICD-10-CM

## 2019-10-10 DIAGNOSIS — E11.22: ICD-10-CM

## 2019-10-10 DIAGNOSIS — R62.7: ICD-10-CM

## 2019-10-10 DIAGNOSIS — I25.10: ICD-10-CM

## 2019-10-10 DIAGNOSIS — F32.9: ICD-10-CM

## 2019-10-10 DIAGNOSIS — D63.1: ICD-10-CM

## 2019-10-10 DIAGNOSIS — Z99.2: ICD-10-CM

## 2019-10-10 DIAGNOSIS — S90.811A: ICD-10-CM

## 2019-10-10 DIAGNOSIS — E11.621: Primary | ICD-10-CM

## 2019-10-10 DIAGNOSIS — J44.9: ICD-10-CM

## 2019-10-10 DIAGNOSIS — I12.0: ICD-10-CM

## 2019-10-10 DIAGNOSIS — E11.51: ICD-10-CM

## 2019-10-10 DIAGNOSIS — E43: ICD-10-CM
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
## 2019-10-10 DIAGNOSIS — M62.579: ICD-10-CM

## 2019-10-10 DIAGNOSIS — R32: ICD-10-CM

## 2019-10-10 LAB
BASOPHILS # BLD AUTO: 0 K/UL (ref 0–8)
BASOPHILS NFR BLD AUTO: 0.8 % (ref 0–2)
BUN SERPL-MCNC: 18 MG/DL (ref 7–18)
CHLORIDE SERPL-SCNC: 102 MMOL/L (ref 98–107)
CO2 SERPL-SCNC: 34 MMOL/L (ref 21–32)
CREAT SERPL-MCNC: 2.1 MG/DL (ref 0.6–1.3)
EOSINOPHIL # BLD AUTO: 0.1 K/UL (ref 0–0.7)
EOSINOPHIL NFR BLD AUTO: 3 % (ref 0–7)
GLUCOSE SERPL-MCNC: 103 MG/DL (ref 74–106)
HCT VFR BLD AUTO: 32.8 % (ref 36.7–47.1)
HGB BLD-MCNC: 10.9 G/DL (ref 12.5–16.3)
LYMPHOCYTES # BLD AUTO: 1.2 K/UL (ref 20–40)
LYMPHOCYTES NFR BLD AUTO: 25.3 % (ref 20.5–51.5)
MCH RBC QN AUTO: 33.2 UUG (ref 23.8–33.4)
MCHC RBC AUTO-ENTMCNC: 33 G/DL (ref 32.5–36.3)
MCV RBC AUTO: 99.7 FL (ref 73–96.2)
MONOCYTES # BLD AUTO: 0.2 K/UL (ref 2–10)
MONOCYTES NFR BLD AUTO: 5.4 % (ref 0–11)
NEUTROPHILS # BLD AUTO: 3 K/UL (ref 1.8–8.9)
NEUTROPHILS NFR BLD AUTO: 65.5 % (ref 38.5–71.5)
PLATELET # BLD AUTO: 190 K/UL (ref 152–348)
POTASSIUM SERPL-SCNC: 4.5 MMOL/L (ref 3.5–5.1)
RBC # BLD AUTO: 3.29 MIL/UL (ref 4.06–5.63)
WBC # BLD AUTO: 4.6 K/UL (ref 3.6–10.2)

## 2019-10-10 PROCEDURE — A4663 DIALYSIS BLOOD PRESSURE CUFF: HCPCS

## 2019-10-10 PROCEDURE — G0378 HOSPITAL OBSERVATION PER HR: HCPCS

## 2019-10-10 RX ADMIN — BENZTROPINE MESYLATE SCH MG: 1 TABLET ORAL at 17:39

## 2019-10-10 RX ADMIN — Medication SCH EACH: at 21:59

## 2019-10-10 RX ADMIN — CLONAZEPAM SCH MG: 0.5 TABLET ORAL at 21:18

## 2019-10-10 RX ADMIN — DOCUSATE SODIUM SCH MG: 100 CAPSULE, LIQUID FILLED ORAL at 17:40

## 2019-10-10 RX ADMIN — Medication SCH EACH: at 17:39

## 2019-10-10 RX ADMIN — DIVALPROEX SODIUM SCH MG: 125 CAPSULE ORAL at 17:39

## 2019-10-11 VITALS — DIASTOLIC BLOOD PRESSURE: 76 MMHG | SYSTOLIC BLOOD PRESSURE: 134 MMHG

## 2019-10-11 VITALS — SYSTOLIC BLOOD PRESSURE: 118 MMHG | DIASTOLIC BLOOD PRESSURE: 71 MMHG

## 2019-10-11 VITALS — SYSTOLIC BLOOD PRESSURE: 111 MMHG | DIASTOLIC BLOOD PRESSURE: 71 MMHG

## 2019-10-11 VITALS — DIASTOLIC BLOOD PRESSURE: 82 MMHG | SYSTOLIC BLOOD PRESSURE: 158 MMHG

## 2019-10-11 VITALS — SYSTOLIC BLOOD PRESSURE: 128 MMHG | DIASTOLIC BLOOD PRESSURE: 74 MMHG

## 2019-10-11 VITALS — SYSTOLIC BLOOD PRESSURE: 121 MMHG | DIASTOLIC BLOOD PRESSURE: 54 MMHG

## 2019-10-11 LAB
ALP SERPL-CCNC: 100 U/L (ref 50–136)
ALT SERPL W/O P-5'-P-CCNC: 29 U/L (ref 16–63)
AST SERPL-CCNC: 22 U/L (ref 15–37)
BASOPHILS # BLD AUTO: 0 K/UL (ref 0–8)
BASOPHILS NFR BLD AUTO: 0.9 % (ref 0–2)
BILIRUB DIRECT SERPL-MCNC: 0.1 MG/DL (ref 0–0.2)
BILIRUB SERPL-MCNC: 0.1 MG/DL (ref 0.2–1)
BUN SERPL-MCNC: 23 MG/DL (ref 7–18)
CHLORIDE SERPL-SCNC: 101 MMOL/L (ref 98–107)
CHOLEST SERPL-MCNC: 88 MG/DL (ref ?–200)
CO2 SERPL-SCNC: 32 MMOL/L (ref 21–32)
CREAT SERPL-MCNC: 2.3 MG/DL (ref 0.6–1.3)
EOSINOPHIL # BLD AUTO: 0.2 K/UL (ref 0–0.7)
EOSINOPHIL NFR BLD AUTO: 4.3 % (ref 0–7)
GLUCOSE SERPL-MCNC: 65 MG/DL (ref 74–106)
HCT VFR BLD AUTO: 33.4 % (ref 36.7–47.1)
HDLC SERPL-MCNC: 34 MG/DL (ref 40–60)
HGB BLD-MCNC: 11.3 G/DL (ref 12.5–16.3)
LYMPHOCYTES # BLD AUTO: 1 K/UL (ref 20–40)
LYMPHOCYTES NFR BLD AUTO: 27.5 % (ref 20.5–51.5)
MAGNESIUM SERPL-MCNC: 1.9 MG/DL (ref 1.8–2.4)
MCH RBC QN AUTO: 33.3 UUG (ref 23.8–33.4)
MCHC RBC AUTO-ENTMCNC: 34 G/DL (ref 32.5–36.3)
MCV RBC AUTO: 99 FL (ref 73–96.2)
MONOCYTES # BLD AUTO: 0.3 K/UL (ref 2–10)
MONOCYTES NFR BLD AUTO: 7.1 % (ref 0–11)
NEUTROPHILS # BLD AUTO: 2.2 K/UL (ref 1.8–8.9)
NEUTROPHILS NFR BLD AUTO: 60.2 % (ref 38.5–71.5)
PHOSPHATE SERPL-MCNC: 4 MG/DL (ref 2.5–4.9)
PLATELET # BLD AUTO: 202 K/UL (ref 152–348)
POTASSIUM SERPL-SCNC: 4.2 MMOL/L (ref 3.5–5.1)
RBC # BLD AUTO: 3.38 MIL/UL (ref 4.06–5.63)
TRIGL SERPL-MCNC: 59 MG/DL (ref 30–150)
TSH SERPL DL<=0.005 MIU/L-ACNC: 4.61 MIU/ML (ref 0.36–3.74)
WBC # BLD AUTO: 3.6 K/UL (ref 3.6–10.2)
WS STN SPEC: 6.2 G/DL (ref 6.4–8.2)

## 2019-10-11 RX ADMIN — BENZTROPINE MESYLATE SCH MG: 1 TABLET ORAL at 18:39

## 2019-10-11 RX ADMIN — CLONAZEPAM SCH MG: 0.5 TABLET ORAL at 14:41

## 2019-10-11 RX ADMIN — FOLIC ACID SCH MG: 1 TABLET ORAL at 09:05

## 2019-10-11 RX ADMIN — DOCUSATE SODIUM SCH MG: 100 CAPSULE, LIQUID FILLED ORAL at 18:38

## 2019-10-11 RX ADMIN — Medication SCH EACH: at 06:55

## 2019-10-11 RX ADMIN — DIVALPROEX SODIUM SCH MG: 125 CAPSULE ORAL at 18:38

## 2019-10-11 RX ADMIN — DIVALPROEX SODIUM SCH MG: 125 CAPSULE ORAL at 09:09

## 2019-10-11 RX ADMIN — CALCIUM ACETATE SCH MG: 667 CAPSULE ORAL at 09:10

## 2019-10-11 RX ADMIN — Medication SCH EACH: at 21:35

## 2019-10-11 RX ADMIN — OXYCODONE HYDROCHLORIDE AND ACETAMINOPHEN SCH MG: 500 TABLET ORAL at 09:08

## 2019-10-11 RX ADMIN — CLONAZEPAM SCH MG: 0.5 TABLET ORAL at 21:24

## 2019-10-11 RX ADMIN — Medication SCH TAB: at 09:08

## 2019-10-11 RX ADMIN — Medication SCH EACH: at 18:15

## 2019-10-11 RX ADMIN — BENZTROPINE MESYLATE SCH MG: 1 TABLET ORAL at 09:08

## 2019-10-11 RX ADMIN — CALCIUM ACETATE SCH MG: 667 CAPSULE ORAL at 18:38

## 2019-10-11 RX ADMIN — BENZTROPINE MESYLATE SCH MG: 1 TABLET ORAL at 13:02

## 2019-10-11 RX ADMIN — DOCUSATE SODIUM SCH MG: 100 CAPSULE, LIQUID FILLED ORAL at 09:05

## 2019-10-11 RX ADMIN — CLONAZEPAM SCH MG: 0.5 TABLET ORAL at 05:34

## 2019-10-11 RX ADMIN — Medication SCH EACH: at 12:27

## 2019-10-11 RX ADMIN — PANTOPRAZOLE SODIUM SCH MG: 40 TABLET, DELAYED RELEASE ORAL at 06:16

## 2019-10-11 RX ADMIN — DIVALPROEX SODIUM SCH MG: 125 CAPSULE ORAL at 13:05

## 2019-10-11 RX ADMIN — VITAMIN D, TAB 1000IU (100/BT) SCH UNIT: 25 TAB at 09:08

## 2019-10-12 VITALS — DIASTOLIC BLOOD PRESSURE: 71 MMHG | SYSTOLIC BLOOD PRESSURE: 154 MMHG

## 2019-10-12 VITALS — DIASTOLIC BLOOD PRESSURE: 76 MMHG | SYSTOLIC BLOOD PRESSURE: 158 MMHG

## 2019-10-12 VITALS — SYSTOLIC BLOOD PRESSURE: 94 MMHG | DIASTOLIC BLOOD PRESSURE: 46 MMHG

## 2019-10-12 VITALS — SYSTOLIC BLOOD PRESSURE: 135 MMHG | DIASTOLIC BLOOD PRESSURE: 62 MMHG

## 2019-10-12 VITALS — DIASTOLIC BLOOD PRESSURE: 24 MMHG | SYSTOLIC BLOOD PRESSURE: 130 MMHG

## 2019-10-12 VITALS — DIASTOLIC BLOOD PRESSURE: 75 MMHG | SYSTOLIC BLOOD PRESSURE: 145 MMHG

## 2019-10-12 LAB
BASOPHILS # BLD AUTO: 0 K/UL (ref 0–8)
BASOPHILS NFR BLD AUTO: 1.1 % (ref 0–2)
BUN SERPL-MCNC: 28 MG/DL (ref 7–18)
CHLORIDE SERPL-SCNC: 104 MMOL/L (ref 98–107)
CO2 SERPL-SCNC: 34 MMOL/L (ref 21–32)
CREAT SERPL-MCNC: 2.5 MG/DL (ref 0.6–1.3)
EOSINOPHIL # BLD AUTO: 0.1 K/UL (ref 0–0.7)
EOSINOPHIL NFR BLD AUTO: 3.5 % (ref 0–7)
GLUCOSE SERPL-MCNC: 53 MG/DL (ref 74–106)
HCT VFR BLD AUTO: 31.4 % (ref 36.7–47.1)
HGB BLD-MCNC: 10.5 G/DL (ref 12.5–16.3)
LYMPHOCYTES # BLD AUTO: 1.2 K/UL (ref 20–40)
LYMPHOCYTES NFR BLD AUTO: 29.4 % (ref 20.5–51.5)
MAGNESIUM SERPL-MCNC: 1.9 MG/DL (ref 1.8–2.4)
MCH RBC QN AUTO: 32.9 UUG (ref 23.8–33.4)
MCHC RBC AUTO-ENTMCNC: 34 G/DL (ref 32.5–36.3)
MCV RBC AUTO: 98.1 FL (ref 73–96.2)
MONOCYTES # BLD AUTO: 0.3 K/UL (ref 2–10)
MONOCYTES NFR BLD AUTO: 7.5 % (ref 0–11)
NEUTROPHILS # BLD AUTO: 2.4 K/UL (ref 1.8–8.9)
NEUTROPHILS NFR BLD AUTO: 58.5 % (ref 38.5–71.5)
PHOSPHATE SERPL-MCNC: 3.9 MG/DL (ref 2.5–4.9)
PLATELET # BLD AUTO: 194 K/UL (ref 152–348)
POTASSIUM SERPL-SCNC: 5 MMOL/L (ref 3.5–5.1)
RBC # BLD AUTO: 3.2 MIL/UL (ref 4.06–5.63)
WBC # BLD AUTO: 4 K/UL (ref 3.6–10.2)

## 2019-10-12 PROCEDURE — 5A1D70Z PERFORMANCE OF URINARY FILTRATION, INTERMITTENT, LESS THAN 6 HOURS PER DAY: ICD-10-PCS

## 2019-10-12 RX ADMIN — CLONAZEPAM SCH MG: 0.5 TABLET ORAL at 06:22

## 2019-10-12 RX ADMIN — Medication SCH EACH: at 20:19

## 2019-10-12 RX ADMIN — CLONAZEPAM SCH MG: 0.5 TABLET ORAL at 21:07

## 2019-10-12 RX ADMIN — Medication SCH TAB: at 09:00

## 2019-10-12 RX ADMIN — CALCIUM ACETATE SCH MG: 667 CAPSULE ORAL at 09:00

## 2019-10-12 RX ADMIN — DIVALPROEX SODIUM SCH MG: 125 CAPSULE ORAL at 17:00

## 2019-10-12 RX ADMIN — VITAMIN D, TAB 1000IU (100/BT) SCH UNIT: 25 TAB at 09:02

## 2019-10-12 RX ADMIN — Medication SCH EACH: at 16:48

## 2019-10-12 RX ADMIN — PANTOPRAZOLE SODIUM SCH MG: 40 TABLET, DELAYED RELEASE ORAL at 06:22

## 2019-10-12 RX ADMIN — Medication SCH EACH: at 11:54

## 2019-10-12 RX ADMIN — Medication SCH EACH: at 06:44

## 2019-10-12 RX ADMIN — DIVALPROEX SODIUM SCH MG: 125 CAPSULE ORAL at 09:04

## 2019-10-12 RX ADMIN — DOCUSATE SODIUM SCH MG: 100 CAPSULE, LIQUID FILLED ORAL at 17:00

## 2019-10-12 RX ADMIN — OXYCODONE HYDROCHLORIDE AND ACETAMINOPHEN SCH MG: 500 TABLET ORAL at 09:03

## 2019-10-12 RX ADMIN — CLONAZEPAM SCH MG: 0.5 TABLET ORAL at 13:08

## 2019-10-12 RX ADMIN — DIVALPROEX SODIUM SCH MG: 125 CAPSULE ORAL at 12:33

## 2019-10-12 RX ADMIN — DOCUSATE SODIUM SCH MG: 100 CAPSULE, LIQUID FILLED ORAL at 09:03

## 2019-10-12 RX ADMIN — BENZTROPINE MESYLATE SCH MG: 1 TABLET ORAL at 17:00

## 2019-10-12 RX ADMIN — BENZTROPINE MESYLATE SCH MG: 1 TABLET ORAL at 12:33

## 2019-10-12 RX ADMIN — FOLIC ACID SCH MG: 1 TABLET ORAL at 09:02

## 2019-10-12 RX ADMIN — BENZTROPINE MESYLATE SCH MG: 1 TABLET ORAL at 09:03

## 2019-10-12 RX ADMIN — CALCIUM ACETATE SCH MG: 667 CAPSULE ORAL at 17:46

## 2019-10-13 VITALS — SYSTOLIC BLOOD PRESSURE: 112 MMHG | DIASTOLIC BLOOD PRESSURE: 60 MMHG

## 2019-10-13 VITALS — DIASTOLIC BLOOD PRESSURE: 68 MMHG | SYSTOLIC BLOOD PRESSURE: 142 MMHG

## 2019-10-13 VITALS — SYSTOLIC BLOOD PRESSURE: 165 MMHG | DIASTOLIC BLOOD PRESSURE: 82 MMHG

## 2019-10-13 VITALS — DIASTOLIC BLOOD PRESSURE: 61 MMHG | SYSTOLIC BLOOD PRESSURE: 105 MMHG

## 2019-10-13 VITALS — DIASTOLIC BLOOD PRESSURE: 81 MMHG | SYSTOLIC BLOOD PRESSURE: 112 MMHG

## 2019-10-13 LAB
BASOPHILS # BLD AUTO: 0 K/UL (ref 0–8)
BASOPHILS NFR BLD AUTO: 0.9 % (ref 0–2)
BUN SERPL-MCNC: 20 MG/DL (ref 7–18)
CHLORIDE SERPL-SCNC: 105 MMOL/L (ref 98–107)
CO2 SERPL-SCNC: 31 MMOL/L (ref 21–32)
CREAT SERPL-MCNC: 1.9 MG/DL (ref 0.6–1.3)
EOSINOPHIL # BLD AUTO: 0.1 K/UL (ref 0–0.7)
EOSINOPHIL NFR BLD AUTO: 3.1 % (ref 0–7)
GLUCOSE SERPL-MCNC: 53 MG/DL (ref 74–106)
HCT VFR BLD AUTO: 34.1 % (ref 36.7–47.1)
HGB BLD-MCNC: 11.2 G/DL (ref 12.5–16.3)
LYMPHOCYTES # BLD AUTO: 0.9 K/UL (ref 20–40)
LYMPHOCYTES NFR BLD AUTO: 20.3 % (ref 20.5–51.5)
MAGNESIUM SERPL-MCNC: 2 MG/DL (ref 1.8–2.4)
MCH RBC QN AUTO: 32.8 UUG (ref 23.8–33.4)
MCHC RBC AUTO-ENTMCNC: 33 G/DL (ref 32.5–36.3)
MCV RBC AUTO: 99.7 FL (ref 73–96.2)
MONOCYTES # BLD AUTO: 0.3 K/UL (ref 2–10)
MONOCYTES NFR BLD AUTO: 7.9 % (ref 0–11)
NEUTROPHILS # BLD AUTO: 2.9 K/UL (ref 1.8–8.9)
NEUTROPHILS NFR BLD AUTO: 67.8 % (ref 38.5–71.5)
PHOSPHATE SERPL-MCNC: 3.6 MG/DL (ref 2.5–4.9)
PLATELET # BLD AUTO: 190 K/UL (ref 152–348)
POTASSIUM SERPL-SCNC: 5.2 MMOL/L (ref 3.5–5.1)
RBC # BLD AUTO: 3.42 MIL/UL (ref 4.06–5.63)
WBC # BLD AUTO: 4.3 K/UL (ref 3.6–10.2)

## 2019-10-13 PROCEDURE — 05HC33Z INSERTION OF INFUSION DEVICE INTO LEFT BASILIC VEIN, PERCUTANEOUS APPROACH: ICD-10-PCS

## 2019-10-13 RX ADMIN — DOCUSATE SODIUM SCH MG: 100 CAPSULE, LIQUID FILLED ORAL at 16:25

## 2019-10-13 RX ADMIN — DIVALPROEX SODIUM SCH MG: 125 CAPSULE ORAL at 12:55

## 2019-10-13 RX ADMIN — Medication SCH EACH: at 11:44

## 2019-10-13 RX ADMIN — DOCUSATE SODIUM SCH MG: 100 CAPSULE, LIQUID FILLED ORAL at 08:55

## 2019-10-13 RX ADMIN — CALCIUM ACETATE SCH MG: 667 CAPSULE ORAL at 08:56

## 2019-10-13 RX ADMIN — PANTOPRAZOLE SODIUM SCH MG: 40 TABLET, DELAYED RELEASE ORAL at 06:13

## 2019-10-13 RX ADMIN — BENZTROPINE MESYLATE SCH MG: 1 TABLET ORAL at 16:25

## 2019-10-13 RX ADMIN — CLONAZEPAM SCH MG: 0.5 TABLET ORAL at 13:07

## 2019-10-13 RX ADMIN — CALCIUM ACETATE SCH MG: 667 CAPSULE ORAL at 17:32

## 2019-10-13 RX ADMIN — Medication SCH EACH: at 07:03

## 2019-10-13 RX ADMIN — Medication SCH TAB: at 08:56

## 2019-10-13 RX ADMIN — BENZTROPINE MESYLATE SCH MG: 1 TABLET ORAL at 12:55

## 2019-10-13 RX ADMIN — OXYCODONE HYDROCHLORIDE AND ACETAMINOPHEN SCH MG: 500 TABLET ORAL at 08:56

## 2019-10-13 RX ADMIN — Medication SCH EACH: at 06:31

## 2019-10-13 RX ADMIN — FOLIC ACID SCH MG: 1 TABLET ORAL at 08:56

## 2019-10-13 RX ADMIN — DIVALPROEX SODIUM SCH MG: 125 CAPSULE ORAL at 16:25

## 2019-10-13 RX ADMIN — BENZTROPINE MESYLATE SCH MG: 1 TABLET ORAL at 08:55

## 2019-10-13 RX ADMIN — CLONAZEPAM SCH MG: 0.5 TABLET ORAL at 05:58

## 2019-10-13 RX ADMIN — Medication SCH EACH: at 16:25

## 2019-10-13 RX ADMIN — DIVALPROEX SODIUM SCH MG: 125 CAPSULE ORAL at 08:56

## 2019-10-13 RX ADMIN — VITAMIN D, TAB 1000IU (100/BT) SCH UNIT: 25 TAB at 08:55
